# Patient Record
Sex: MALE | Race: WHITE | NOT HISPANIC OR LATINO | Employment: OTHER | RURAL
[De-identification: names, ages, dates, MRNs, and addresses within clinical notes are randomized per-mention and may not be internally consistent; named-entity substitution may affect disease eponyms.]

---

## 2017-01-01 ENCOUNTER — TELEPHONE (OUTPATIENT)
Dept: FAMILY MEDICINE CLINIC | Facility: CLINIC | Age: 72
End: 2017-01-01

## 2017-01-01 ENCOUNTER — LAB REQUISITION (OUTPATIENT)
Dept: LAB | Facility: HOSPITAL | Age: 72
End: 2017-01-01

## 2017-01-01 ENCOUNTER — APPOINTMENT (OUTPATIENT)
Dept: GENERAL RADIOLOGY | Facility: HOSPITAL | Age: 72
End: 2017-01-01

## 2017-01-01 ENCOUNTER — HOSPITAL ENCOUNTER (EMERGENCY)
Facility: HOSPITAL | Age: 72
Discharge: HOME | End: 2017-10-25
Attending: EMERGENCY MEDICINE | Admitting: EMERGENCY MEDICINE

## 2017-01-01 ENCOUNTER — HOSPITAL ENCOUNTER (EMERGENCY)
Facility: HOSPITAL | Age: 72
Discharge: ANOTHER HOSPITAL | End: 2017-11-13
Attending: FAMILY MEDICINE | Admitting: FAMILY MEDICINE

## 2017-01-01 ENCOUNTER — HOSPITAL ENCOUNTER (EMERGENCY)
Facility: HOSPITAL | Age: 72
Discharge: HOME | End: 2017-11-12
Attending: EMERGENCY MEDICINE | Admitting: EMERGENCY MEDICINE

## 2017-01-01 VITALS
HEIGHT: 67 IN | HEART RATE: 72 BPM | SYSTOLIC BLOOD PRESSURE: 104 MMHG | RESPIRATION RATE: 19 BRPM | TEMPERATURE: 97.7 F | OXYGEN SATURATION: 100 % | DIASTOLIC BLOOD PRESSURE: 57 MMHG | BODY MASS INDEX: 18.83 KG/M2 | WEIGHT: 120 LBS

## 2017-01-01 VITALS
WEIGHT: 125 LBS | HEIGHT: 70 IN | OXYGEN SATURATION: 99 % | DIASTOLIC BLOOD PRESSURE: 59 MMHG | SYSTOLIC BLOOD PRESSURE: 116 MMHG | RESPIRATION RATE: 18 BRPM | TEMPERATURE: 98.2 F | HEART RATE: 77 BPM | BODY MASS INDEX: 17.9 KG/M2

## 2017-01-01 VITALS
DIASTOLIC BLOOD PRESSURE: 59 MMHG | OXYGEN SATURATION: 100 % | BODY MASS INDEX: 16.89 KG/M2 | TEMPERATURE: 97.8 F | WEIGHT: 118 LBS | SYSTOLIC BLOOD PRESSURE: 117 MMHG | RESPIRATION RATE: 18 BRPM | HEART RATE: 73 BPM | HEIGHT: 70 IN

## 2017-01-01 DIAGNOSIS — I47.1 PSVT (PAROXYSMAL SUPRAVENTRICULAR TACHYCARDIA) (HCC): Primary | ICD-10-CM

## 2017-01-01 DIAGNOSIS — I47.1 PAROXYSMAL SVT (SUPRAVENTRICULAR TACHYCARDIA) (HCC): Primary | ICD-10-CM

## 2017-01-01 DIAGNOSIS — I47.1 SVT (SUPRAVENTRICULAR TACHYCARDIA) (HCC): Primary | ICD-10-CM

## 2017-01-01 DIAGNOSIS — L03.90 CELLULITIS: ICD-10-CM

## 2017-01-01 LAB
ALBUMIN SERPL-MCNC: 2.5 G/DL (ref 3.4–4.8)
ALBUMIN SERPL-MCNC: 2.5 G/DL (ref 3.4–4.8)
ALBUMIN SERPL-MCNC: 2.8 G/DL (ref 3.4–4.8)
ALBUMIN SERPL-MCNC: 2.9 G/DL (ref 3.4–4.8)
ALBUMIN/GLOB SERPL: 0.7 G/DL (ref 1.5–2.5)
ALBUMIN/GLOB SERPL: 0.8 G/DL (ref 1.5–2.5)
ALP SERPL-CCNC: 101 U/L (ref 40–129)
ALP SERPL-CCNC: 104 U/L (ref 40–129)
ALP SERPL-CCNC: 157 U/L (ref 40–129)
ALP SERPL-CCNC: 167 U/L (ref 40–129)
ALT SERPL W P-5'-P-CCNC: 16 U/L (ref 10–44)
ALT SERPL W P-5'-P-CCNC: 28 U/L (ref 10–44)
ALT SERPL W P-5'-P-CCNC: 91 U/L (ref 10–44)
ALT SERPL W P-5'-P-CCNC: 95 U/L (ref 10–44)
ANION GAP SERPL CALCULATED.3IONS-SCNC: 5.2 MMOL/L (ref 3.6–11.2)
ANION GAP SERPL CALCULATED.3IONS-SCNC: 6.8 MMOL/L (ref 3.6–11.2)
ANION GAP SERPL CALCULATED.3IONS-SCNC: 8.1 MMOL/L (ref 3.6–11.2)
ANION GAP SERPL CALCULATED.3IONS-SCNC: 9.7 MMOL/L (ref 3.6–11.2)
ANISOCYTOSIS BLD QL: ABNORMAL
ANISOCYTOSIS BLD QL: NORMAL
APTT PPP: 32.6 SECONDS (ref 23.8–36.1)
AST SERPL-CCNC: 22 U/L (ref 10–34)
AST SERPL-CCNC: 37 U/L (ref 10–34)
AST SERPL-CCNC: 87 U/L (ref 10–34)
AST SERPL-CCNC: 98 U/L (ref 10–34)
BASOPHILS # BLD AUTO: 0.01 10*3/MM3 (ref 0–0.3)
BASOPHILS NFR BLD AUTO: 1 % (ref 0–2)
BILIRUB SERPL-MCNC: 0.3 MG/DL (ref 0.2–1.8)
BNP SERPL-MCNC: 472 PG/ML (ref 0–100)
BUN BLD-MCNC: 14 MG/DL (ref 7–21)
BUN BLD-MCNC: 17 MG/DL (ref 7–21)
BUN BLD-MCNC: 23 MG/DL (ref 7–21)
BUN BLD-MCNC: 24 MG/DL (ref 7–21)
BUN/CREAT SERPL: 12.2 (ref 7–25)
BUN/CREAT SERPL: 13.7 (ref 7–25)
BUN/CREAT SERPL: 14.4 (ref 7–25)
BUN/CREAT SERPL: 15.8 (ref 7–25)
CALCIUM SPEC-SCNC: 8.5 MG/DL (ref 7.7–10)
CALCIUM SPEC-SCNC: 8.7 MG/DL (ref 7.7–10)
CALCIUM SPEC-SCNC: 9.1 MG/DL (ref 7.7–10)
CALCIUM SPEC-SCNC: 9.6 MG/DL (ref 7.7–10)
CHLORIDE SERPL-SCNC: 107 MMOL/L (ref 99–112)
CHLORIDE SERPL-SCNC: 108 MMOL/L (ref 99–112)
CHLORIDE SERPL-SCNC: 108 MMOL/L (ref 99–112)
CHLORIDE SERPL-SCNC: 111 MMOL/L (ref 99–112)
CK MB SERPL-CCNC: 0.33 NG/ML (ref 0–5)
CK MB SERPL-CCNC: 0.53 NG/ML (ref 0–5)
CK MB SERPL-RTO: 2.8 % (ref 0–3)
CK MB SERPL-RTO: 5.3 % (ref 0–3)
CK SERPL-CCNC: 10 U/L (ref 24–204)
CK SERPL-CCNC: 12 U/L (ref 24–204)
CO2 SERPL-SCNC: 21.3 MMOL/L (ref 24.3–31.9)
CO2 SERPL-SCNC: 23.9 MMOL/L (ref 24.3–31.9)
CO2 SERPL-SCNC: 25.2 MMOL/L (ref 24.3–31.9)
CO2 SERPL-SCNC: 26.8 MMOL/L (ref 24.3–31.9)
CREAT BLD-MCNC: 1.02 MG/DL (ref 0.43–1.29)
CREAT BLD-MCNC: 1.39 MG/DL (ref 0.43–1.29)
CREAT BLD-MCNC: 1.52 MG/DL (ref 0.43–1.29)
CREAT BLD-MCNC: 1.6 MG/DL (ref 0.43–1.29)
DEPRECATED RDW RBC AUTO: 66.5 FL (ref 37–54)
DEPRECATED RDW RBC AUTO: 69.6 FL (ref 37–54)
DEPRECATED RDW RBC AUTO: 70.5 FL (ref 37–54)
DEPRECATED RDW RBC AUTO: 71.2 FL (ref 37–54)
EOSINOPHIL # BLD AUTO: 0.14 10*3/MM3 (ref 0–0.7)
EOSINOPHIL # BLD MANUAL: 0.15 10*3/MM3 (ref 0–0.7)
EOSINOPHIL # BLD MANUAL: 0.4 10*3/MM3 (ref 0–0.7)
EOSINOPHIL # BLD MANUAL: 0.48 10*3/MM3 (ref 0–0.7)
EOSINOPHIL NFR BLD AUTO: 14.6 % (ref 0–7)
EOSINOPHIL NFR BLD MANUAL: 12 % (ref 0–7)
EOSINOPHIL NFR BLD MANUAL: 14 % (ref 0–7)
EOSINOPHIL NFR BLD MANUAL: 24 % (ref 0–7)
ERYTHROCYTE [DISTWIDTH] IN BLOOD BY AUTOMATED COUNT: 22.4 % (ref 11.5–14.5)
ERYTHROCYTE [DISTWIDTH] IN BLOOD BY AUTOMATED COUNT: 23.2 % (ref 11.5–14.5)
GFR SERPL CREATININE-BSD FRML MDRD: 43 ML/MIN/1.73
GFR SERPL CREATININE-BSD FRML MDRD: 46 ML/MIN/1.73
GFR SERPL CREATININE-BSD FRML MDRD: 51 ML/MIN/1.73
GFR SERPL CREATININE-BSD FRML MDRD: 72 ML/MIN/1.73
GLOBULIN UR ELPH-MCNC: 3.6 GM/DL
GLOBULIN UR ELPH-MCNC: 3.7 GM/DL
GLOBULIN UR ELPH-MCNC: 3.7 GM/DL
GLOBULIN UR ELPH-MCNC: 4 GM/DL
GLUCOSE BLD-MCNC: 105 MG/DL (ref 70–110)
GLUCOSE BLD-MCNC: 108 MG/DL (ref 70–110)
GLUCOSE BLD-MCNC: 112 MG/DL (ref 70–110)
GLUCOSE BLD-MCNC: 115 MG/DL (ref 70–110)
HCT VFR BLD AUTO: 27.9 % (ref 42–52)
HCT VFR BLD AUTO: 32.5 % (ref 42–52)
HCT VFR BLD AUTO: 34.4 % (ref 42–52)
HCT VFR BLD AUTO: 37.1 % (ref 42–52)
HGB BLD-MCNC: 10.8 G/DL (ref 14–18)
HGB BLD-MCNC: 11.7 G/DL (ref 14–18)
HGB BLD-MCNC: 8.6 G/DL (ref 14–18)
HGB BLD-MCNC: 9.9 G/DL (ref 14–18)
HYPOCHROMIA BLD QL: ABNORMAL
IMM GRANULOCYTES # BLD: 0 10*3/MM3 (ref 0–0.03)
IMM GRANULOCYTES NFR BLD: 0 % (ref 0–0.5)
INR PPP: 1.15 (ref 0.9–1.1)
LYMPHOCYTES # BLD AUTO: 0.17 10*3/MM3 (ref 1–3)
LYMPHOCYTES # BLD MANUAL: 0.15 10*3/MM3 (ref 1–3)
LYMPHOCYTES # BLD MANUAL: 0.34 10*3/MM3 (ref 1–3)
LYMPHOCYTES # BLD MANUAL: 0.57 10*3/MM3 (ref 1–3)
LYMPHOCYTES NFR BLD AUTO: 17.7 % (ref 16–46)
LYMPHOCYTES NFR BLD MANUAL: 12 % (ref 16–46)
LYMPHOCYTES NFR BLD MANUAL: 17 % (ref 16–46)
LYMPHOCYTES NFR BLD MANUAL: 20 % (ref 16–46)
LYMPHOCYTES NFR BLD MANUAL: 3 % (ref 0–12)
LYMPHOCYTES NFR BLD MANUAL: 6 % (ref 0–12)
LYMPHOCYTES NFR BLD MANUAL: 8 % (ref 0–12)
MCH RBC QN AUTO: 26.8 PG (ref 27–33)
MCH RBC QN AUTO: 27 PG (ref 27–33)
MCH RBC QN AUTO: 28.1 PG (ref 27–33)
MCH RBC QN AUTO: 28.3 PG (ref 27–33)
MCHC RBC AUTO-ENTMCNC: 30.5 G/DL (ref 33–37)
MCHC RBC AUTO-ENTMCNC: 30.8 G/DL (ref 33–37)
MCHC RBC AUTO-ENTMCNC: 31.4 G/DL (ref 33–37)
MCHC RBC AUTO-ENTMCNC: 31.5 G/DL (ref 33–37)
MCV RBC AUTO: 87.7 FL (ref 80–94)
MCV RBC AUTO: 88.1 FL (ref 80–94)
MCV RBC AUTO: 89.6 FL (ref 80–94)
MCV RBC AUTO: 89.6 FL (ref 80–94)
MONOCYTES # BLD AUTO: 0.09 10*3/MM3 (ref 0.1–0.9)
MONOCYTES # BLD AUTO: 0.1 10*3/MM3 (ref 0.1–0.9)
MONOCYTES # BLD AUTO: 0.12 10*3/MM3 (ref 0.1–0.9)
MONOCYTES # BLD AUTO: 0.13 10*3/MM3 (ref 0.1–0.9)
MONOCYTES NFR BLD AUTO: 13.5 % (ref 0–12)
NEUTROPHILS # BLD AUTO: 0.51 10*3/MM3 (ref 1.4–6.5)
NEUTROPHILS # BLD AUTO: 0.85 10*3/MM3 (ref 1.4–6.5)
NEUTROPHILS # BLD AUTO: 1.07 10*3/MM3 (ref 1.4–6.5)
NEUTROPHILS # BLD AUTO: 1.79 10*3/MM3 (ref 1.4–6.5)
NEUTROPHILS NFR BLD AUTO: 53.2 % (ref 40–75)
NEUTROPHILS NFR BLD MANUAL: 38 % (ref 40–75)
NEUTROPHILS NFR BLD MANUAL: 56 % (ref 40–75)
NEUTROPHILS NFR BLD MANUAL: 68 % (ref 40–75)
NEUTS BAND NFR BLD MANUAL: 15 % (ref 0–8)
NEUTS BAND NFR BLD MANUAL: 7 % (ref 0–8)
OSMOLALITY SERPL CALC.SUM OF ELEC: 278.5 MOSM/KG (ref 273–305)
OSMOLALITY SERPL CALC.SUM OF ELEC: 283.8 MOSM/KG (ref 273–305)
OSMOLALITY SERPL CALC.SUM OF ELEC: 283.8 MOSM/KG (ref 273–305)
OSMOLALITY SERPL CALC.SUM OF ELEC: 285.6 MOSM/KG (ref 273–305)
OVALOCYTES BLD QL SMEAR: NORMAL
PLAT MORPH BLD: NORMAL
PLAT MORPH BLD: NORMAL
PLATELET # BLD AUTO: 114 10*3/MM3 (ref 130–400)
PLATELET # BLD AUTO: 123 10*3/MM3 (ref 130–400)
PLATELET # BLD AUTO: 124 10*3/MM3 (ref 130–400)
PLATELET # BLD AUTO: 126 10*3/MM3 (ref 130–400)
PMV BLD AUTO: 10 FL (ref 6–10)
PMV BLD AUTO: 10 FL (ref 6–10)
PMV BLD AUTO: 10.4 FL (ref 6–10)
PMV BLD AUTO: 9.7 FL (ref 6–10)
POIKILOCYTOSIS BLD QL SMEAR: ABNORMAL
POTASSIUM BLD-SCNC: 3.5 MMOL/L (ref 3.5–5.3)
POTASSIUM BLD-SCNC: 4 MMOL/L (ref 3.5–5.3)
POTASSIUM BLD-SCNC: 4.2 MMOL/L (ref 3.5–5.3)
POTASSIUM BLD-SCNC: 4.7 MMOL/L (ref 3.5–5.3)
PROT SERPL-MCNC: 6.2 G/DL (ref 6–8)
PROT SERPL-MCNC: 6.2 G/DL (ref 6–8)
PROT SERPL-MCNC: 6.5 G/DL (ref 6–8)
PROT SERPL-MCNC: 6.8 G/DL (ref 6–8)
PROTHROMBIN TIME: 14.9 SECONDS (ref 11–15.4)
RBC # BLD AUTO: 3.18 10*6/MM3 (ref 4.7–6.1)
RBC # BLD AUTO: 3.69 10*6/MM3 (ref 4.7–6.1)
RBC # BLD AUTO: 3.84 10*6/MM3 (ref 4.7–6.1)
RBC # BLD AUTO: 4.14 10*6/MM3 (ref 4.7–6.1)
SCAN SLIDE: NORMAL
SMALL PLATELETS BLD QL SMEAR: ABNORMAL
SMALL PLATELETS BLD QL SMEAR: NORMAL
SODIUM BLD-SCNC: 139 MMOL/L (ref 135–153)
SODIUM BLD-SCNC: 140 MMOL/L (ref 135–153)
SODIUM BLD-SCNC: 140 MMOL/L (ref 135–153)
SODIUM BLD-SCNC: 142 MMOL/L (ref 135–153)
TROPONIN I SERPL-MCNC: 0.03 NG/ML
TROPONIN I SERPL-MCNC: <0.006 NG/ML
TROPONIN I SERPL-MCNC: <0.006 NG/ML
VANCOMYCIN TROUGH SERPL-MCNC: 25.8 MCG/ML (ref 5–15)
WBC NRBC COR # BLD: 0.96 10*3/MM3 (ref 4.5–12.5)
WBC NRBC COR # BLD: 1.25 10*3/MM3 (ref 4.5–12.5)
WBC NRBC COR # BLD: 2.02 10*3/MM3 (ref 4.5–12.5)
WBC NRBC COR # BLD: 2.84 10*3/MM3 (ref 4.5–12.5)

## 2017-01-01 PROCEDURE — 80053 COMPREHEN METABOLIC PANEL: CPT | Performed by: FAMILY MEDICINE

## 2017-01-01 PROCEDURE — 85025 COMPLETE CBC W/AUTO DIFF WBC: CPT | Performed by: FAMILY MEDICINE

## 2017-01-01 PROCEDURE — 84484 ASSAY OF TROPONIN QUANT: CPT | Performed by: EMERGENCY MEDICINE

## 2017-01-01 PROCEDURE — 85007 BL SMEAR W/DIFF WBC COUNT: CPT | Performed by: INTERNAL MEDICINE

## 2017-01-01 PROCEDURE — 82553 CREATINE MB FRACTION: CPT | Performed by: EMERGENCY MEDICINE

## 2017-01-01 PROCEDURE — 93005 ELECTROCARDIOGRAM TRACING: CPT | Performed by: EMERGENCY MEDICINE

## 2017-01-01 PROCEDURE — 93005 ELECTROCARDIOGRAM TRACING: CPT | Performed by: FAMILY MEDICINE

## 2017-01-01 PROCEDURE — 96374 THER/PROPH/DIAG INJ IV PUSH: CPT

## 2017-01-01 PROCEDURE — 84484 ASSAY OF TROPONIN QUANT: CPT | Performed by: FAMILY MEDICINE

## 2017-01-01 PROCEDURE — 71010 XR CHEST 1 VW: CPT | Performed by: RADIOLOGY

## 2017-01-01 PROCEDURE — 85007 BL SMEAR W/DIFF WBC COUNT: CPT | Performed by: EMERGENCY MEDICINE

## 2017-01-01 PROCEDURE — 99285 EMERGENCY DEPT VISIT HI MDM: CPT

## 2017-01-01 PROCEDURE — 83880 ASSAY OF NATRIURETIC PEPTIDE: CPT | Performed by: FAMILY MEDICINE

## 2017-01-01 PROCEDURE — 71010 HC CHEST PA OR AP: CPT

## 2017-01-01 PROCEDURE — 96361 HYDRATE IV INFUSION ADD-ON: CPT

## 2017-01-01 PROCEDURE — 85007 BL SMEAR W/DIFF WBC COUNT: CPT | Performed by: FAMILY MEDICINE

## 2017-01-01 PROCEDURE — 85025 COMPLETE CBC W/AUTO DIFF WBC: CPT | Performed by: INTERNAL MEDICINE

## 2017-01-01 PROCEDURE — 93010 ELECTROCARDIOGRAM REPORT: CPT | Performed by: INTERNAL MEDICINE

## 2017-01-01 PROCEDURE — 99284 EMERGENCY DEPT VISIT MOD MDM: CPT

## 2017-01-01 PROCEDURE — 96375 TX/PRO/DX INJ NEW DRUG ADDON: CPT

## 2017-01-01 PROCEDURE — 80053 COMPREHEN METABOLIC PANEL: CPT | Performed by: INTERNAL MEDICINE

## 2017-01-01 PROCEDURE — 80053 COMPREHEN METABOLIC PANEL: CPT | Performed by: EMERGENCY MEDICINE

## 2017-01-01 PROCEDURE — 85730 THROMBOPLASTIN TIME PARTIAL: CPT | Performed by: EMERGENCY MEDICINE

## 2017-01-01 PROCEDURE — 85025 COMPLETE CBC W/AUTO DIFF WBC: CPT | Performed by: EMERGENCY MEDICINE

## 2017-01-01 PROCEDURE — 82550 ASSAY OF CK (CPK): CPT | Performed by: EMERGENCY MEDICINE

## 2017-01-01 PROCEDURE — 85610 PROTHROMBIN TIME: CPT | Performed by: EMERGENCY MEDICINE

## 2017-01-01 PROCEDURE — 25010000002 ADENOSINE PER 6 MG

## 2017-01-01 PROCEDURE — 80202 ASSAY OF VANCOMYCIN: CPT | Performed by: INTERNAL MEDICINE

## 2017-01-01 RX ORDER — ADENOSINE 3 MG/ML
INJECTION, SOLUTION INTRAVENOUS
Status: COMPLETED
Start: 2017-01-01 | End: 2017-01-01

## 2017-01-01 RX ORDER — ADENOSINE 3 MG/ML
6 INJECTION, SOLUTION INTRAVENOUS ONCE
Status: COMPLETED | OUTPATIENT
Start: 2017-01-01 | End: 2017-01-01

## 2017-01-01 RX ORDER — SODIUM CHLORIDE 9 MG/ML
INJECTION, SOLUTION INTRAVENOUS
Status: COMPLETED
Start: 2017-01-01 | End: 2017-01-01

## 2017-01-01 RX ORDER — METOPROLOL SUCCINATE 50 MG/1
50 TABLET, EXTENDED RELEASE ORAL DAILY
Qty: 15 TABLET | Refills: 0 | Status: SHIPPED | OUTPATIENT
Start: 2017-01-01

## 2017-01-01 RX ORDER — SODIUM CHLORIDE 9 MG/ML
INJECTION, SOLUTION INTRAVENOUS
Status: DISCONTINUED
Start: 2017-01-01 | End: 2017-01-01 | Stop reason: HOSPADM

## 2017-01-01 RX ORDER — SODIUM CHLORIDE 0.9 % (FLUSH) 0.9 %
10 SYRINGE (ML) INJECTION AS NEEDED
Status: DISCONTINUED | OUTPATIENT
Start: 2017-01-01 | End: 2017-01-01 | Stop reason: HOSPADM

## 2017-01-01 RX ORDER — ADENOSINE 3 MG/ML
12 INJECTION, SOLUTION INTRAVENOUS ONCE
Status: COMPLETED | OUTPATIENT
Start: 2017-01-01 | End: 2017-01-01

## 2017-01-01 RX ORDER — METOPROLOL TARTRATE 5 MG/5ML
5 INJECTION INTRAVENOUS ONCE
Status: COMPLETED | OUTPATIENT
Start: 2017-01-01 | End: 2017-01-01

## 2017-01-01 RX ADMIN — ADENOSINE 12 MG: 3 INJECTION, SOLUTION INTRAVENOUS at 04:24

## 2017-01-01 RX ADMIN — ADENOSINE 6 MG: 3 INJECTION, SOLUTION INTRAVENOUS at 14:17

## 2017-01-01 RX ADMIN — SODIUM CHLORIDE 500 ML: 9 INJECTION, SOLUTION INTRAVENOUS at 14:16

## 2017-01-01 RX ADMIN — METOPROLOL TARTRATE 5 MG: 1 INJECTION, SOLUTION INTRAVENOUS at 04:31

## 2017-01-01 RX ADMIN — ADENOSINE 6 MG: 3 INJECTION, SOLUTION INTRAVENOUS at 16:22

## 2017-01-01 RX ADMIN — SODIUM CHLORIDE 1000 ML: 9 INJECTION, SOLUTION INTRAVENOUS at 04:20

## 2017-01-01 RX ADMIN — SODIUM CHLORIDE: 9 INJECTION, SOLUTION INTRAVENOUS at 16:15

## 2017-01-01 RX ADMIN — SODIUM CHLORIDE 500 ML: 9 INJECTION, SOLUTION INTRAVENOUS at 16:07

## 2017-02-01 ENCOUNTER — APPOINTMENT (OUTPATIENT)
Dept: ULTRASOUND IMAGING | Facility: HOSPITAL | Age: 72
End: 2017-02-01
Attending: INTERNAL MEDICINE

## 2017-04-20 ENCOUNTER — OFFICE VISIT (OUTPATIENT)
Dept: FAMILY MEDICINE CLINIC | Facility: CLINIC | Age: 72
End: 2017-04-20

## 2017-04-20 ENCOUNTER — HOSPITAL ENCOUNTER (OUTPATIENT)
Dept: GENERAL RADIOLOGY | Facility: HOSPITAL | Age: 72
Discharge: HOME | End: 2017-04-20
Admitting: NURSE PRACTITIONER

## 2017-04-20 VITALS
SYSTOLIC BLOOD PRESSURE: 111 MMHG | OXYGEN SATURATION: 98 % | BODY MASS INDEX: 18.54 KG/M2 | DIASTOLIC BLOOD PRESSURE: 60 MMHG | TEMPERATURE: 99.2 F | HEIGHT: 70 IN | WEIGHT: 129.5 LBS | HEART RATE: 104 BPM

## 2017-04-20 DIAGNOSIS — J02.8 ACUTE PHARYNGITIS DUE TO OTHER SPECIFIED ORGANISMS: ICD-10-CM

## 2017-04-20 DIAGNOSIS — R50.81 FEVER IN OTHER DISEASES: Primary | ICD-10-CM

## 2017-04-20 DIAGNOSIS — R05.9 COUGH: ICD-10-CM

## 2017-04-20 DIAGNOSIS — R50.81 FEVER IN OTHER DISEASES: ICD-10-CM

## 2017-04-20 LAB
EXPIRATION DATE: NORMAL
EXPIRATION DATE: NORMAL
FLUAV AG NPH QL: NEGATIVE
FLUBV AG NPH QL: NEGATIVE
INTERNAL CONTROL: NORMAL
INTERNAL CONTROL: NORMAL
Lab: NORMAL
Lab: NORMAL
S PYO AG THROAT QL: NEGATIVE

## 2017-04-20 PROCEDURE — 71020 XR CHEST PA AND LATERAL: CPT | Performed by: RADIOLOGY

## 2017-04-20 PROCEDURE — 99213 OFFICE O/P EST LOW 20 MIN: CPT | Performed by: NURSE PRACTITIONER

## 2017-04-20 PROCEDURE — 71020 HC CHEST PA AND LATERAL: CPT

## 2017-04-20 PROCEDURE — 87880 STREP A ASSAY W/OPTIC: CPT | Performed by: NURSE PRACTITIONER

## 2017-04-20 PROCEDURE — 87804 INFLUENZA ASSAY W/OPTIC: CPT | Performed by: NURSE PRACTITIONER

## 2017-04-20 RX ORDER — AZITHROMYCIN 250 MG/1
TABLET, FILM COATED ORAL
Qty: 6 TABLET | Refills: 0 | Status: SHIPPED | OUTPATIENT
Start: 2017-04-20

## 2017-04-20 NOTE — PROGRESS NOTES
"Subjective   Rolan Page is a 71 y.o. male.   Chief Complaint   Patient presents with   • Sore Throat     Sore Throat    This is a new problem. The current episode started 1 to 4 weeks ago. The problem has been waxing and waning. The maximum temperature recorded prior to his arrival was 101 - 101.9 F. Associated symptoms include congestion and coughing. Pertinent negatives include no abdominal pain, diarrhea, ear discharge, ear pain, headaches, shortness of breath, swollen glands, trouble swallowing or vomiting. Associated symptoms comments: Back pain. He has had exposure to strep. He has tried acetaminophen for the symptoms. The treatment provided no relief.        The following portions of the patient's history were reviewed and updated as appropriate: allergies, current medications, past family history, past medical history, past social history, past surgical history and problem list.  /60 (BP Location: Left arm, Patient Position: Sitting)  Pulse 104  Temp 99.2 °F (37.3 °C) (Oral)   Ht 70\" (177.8 cm)  Wt 129 lb 8 oz (58.7 kg)  SpO2 98% Comment: Room air  BMI 18.58 kg/m2  Review of Systems   Constitutional: Negative for chills, fatigue and fever.   HENT: Positive for congestion and sore throat. Negative for ear discharge, ear pain, rhinorrhea and trouble swallowing.    Respiratory: Positive for cough. Negative for shortness of breath and wheezing.    Cardiovascular: Negative for chest pain, palpitations and leg swelling.   Gastrointestinal: Negative for abdominal pain, diarrhea, nausea and vomiting.   Genitourinary: Negative for dysuria.   Musculoskeletal: Negative for back pain and myalgias.   Skin: Negative for rash and wound.   Neurological: Negative for dizziness, light-headedness and headaches.   Psychiatric/Behavioral: Negative for sleep disturbance. The patient is not nervous/anxious.        Objective   Physical Exam   Constitutional: He is oriented to person, place, and time. He appears " well-developed and well-nourished.   HENT:   Head: Normocephalic and atraumatic.   Right Ear: External ear normal.   Left Ear: External ear normal.   Oropharynx erythematous   Cardiovascular: Normal rate, regular rhythm and normal heart sounds.    Pulmonary/Chest: Effort normal.   Diminished sounds throughout   Abdominal: Soft. Bowel sounds are normal.   Musculoskeletal: Normal range of motion.   Lymphadenopathy:     He has no cervical adenopathy.   Neurological: He is alert and oriented to person, place, and time.   Skin: Skin is warm and dry.   Psychiatric: He has a normal mood and affect. His behavior is normal.       Assessment/Plan   Rolan was seen today for sore throat.    Diagnoses and all orders for this visit:    Fever in other diseases  -     POC Rapid Strep A  -     POC Influenza A / B  -     XR Chest PA & Lateral; Future    Acute pharyngitis due to other specified organisms  -     azithromycin (ZITHROMAX Z-PEPE) 250 MG tablet; Take 2 tablets the first day, then 1 tablet daily for 4 days.    Cough  -     XR Chest PA & Lateral; Future      Rapid strep negative  Flu A/B negative    Symptoms are consistent with strep pharyngitis despite the negative rapid strep test. The cough and fever are concerning as well. Will order chest x-ray to rule out pneumonia. Treat with Azithromycin. Administration and SE discussed. Supportive measures encouraged. Follow up in 2-3 days if no improvement or if symptoms worsen.

## 2017-10-25 NOTE — ED PROVIDER NOTES
Subjective   HPI Comments: Patient comes in with acute onset palpitations or fast heart rate.  Generalized weakness.  Shortness of breath.  This is similar to an incident he had last week.  He was transferred to the [facility].  He had to be cardioverted during that admission for either SVT or A. fib..  He was discharged home on a medicine that starts with an believed to be metoprolol.    Patient is a 72 y.o. male presenting with palpitations.   History provided by:  Patient, spouse and medical records  Palpitations   Palpitations quality:  Fast  Onset quality:  Sudden  Duration: 0315.  Timing:  Constant  Progression:  Unchanged  Chronicity:  Recurrent  Context: not anxiety, not appetite suppressants, not blood loss, not bronchodilators, not caffeine, not dehydration, not exercise, not hyperventilation, not illicit drugs, not nicotine and not stimulant use    Relieved by:  Nothing  Worsened by:  Nothing  Ineffective treatments:  Valsalva  Associated symptoms: chest pressure, malaise/fatigue, shortness of breath and weakness    Associated symptoms: no back pain, no chest pain, no cough, no diaphoresis, no dizziness, no hemoptysis, no leg pain, no lower extremity edema, no nausea, no near-syncope, no numbness, no orthopnea, no PND, no syncope and no vomiting    Risk factors: stress    Risk factors: no diabetes mellitus, no heart disease, no hx of DVT, no hx of PE, no hx of thyroid disease, no hypercoagulable state, no hyperthyroidism and no OTC sinus medications        Review of Systems   Constitutional: Positive for malaise/fatigue. Negative for diaphoresis.   HENT: Negative.  Negative for congestion, nosebleeds and postnasal drip.    Eyes: Negative.    Respiratory: Positive for chest tightness and shortness of breath. Negative for cough and hemoptysis.    Cardiovascular: Positive for palpitations. Negative for chest pain, orthopnea, syncope, PND and near-syncope.   Gastrointestinal: Negative.  Negative  for nausea and vomiting.   Endocrine: Negative.    Genitourinary: Negative.    Musculoskeletal: Negative.  Negative for back pain.   Skin: Negative.    Allergic/Immunologic: Positive for immunocompromised state.   Neurological: Positive for weakness. Negative for dizziness and numbness.   Hematological: Bruises/bleeds easily.   Psychiatric/Behavioral: Negative.    All other systems reviewed and are negative.      Past Medical History:   Diagnosis Date   • BPH    • BPH (benign prostatic hyperplasia)    • Degenerative arthritis    • Dyslipidemia    • Hyperlipidemia    • Hypertension    • Left inguinal hernia    • SVT (supraventricular tachycardia)        No Known Allergies    Past Surgical History:   Procedure Laterality Date   • HERNIA REPAIR     • HERNIA REPAIR     • [place] DRAIN LOWER LEG DEEP ABSC/HEMATOMA Right 8/29/2016    Procedure: LOWER EXTREMITY DEBRIDEMENT;  Surgeon: Alok Munoz MD;  Location: [facility];  Service: General       Family History   Problem Relation Age of Onset   • Hypertension Mother        Social History     Social History   • Marital status:      Spouse name: N/A   • Number of children: N/A   • Years of education: N/A     Social History Main Topics   • Smoking status: Current Every Day Smoker     Packs/day: 1.00     Years: 35.00   • Smokeless tobacco: Never Used   • Alcohol use No      Comment: rarely   • Drug use: No   • Sexual activity: Defer     Other Topics Concern   • None     Social History Narrative   • None           Objective   Physical Exam   Constitutional: He appears well-developed. No distress.   Thin, chronically ill appearing   HENT:   Head: Normocephalic and atraumatic.   Nose: Nose normal.   Mouth/Throat: Oropharynx is clear and moist.   Eyes: EOM are normal. Right eye exhibits no discharge. Left eye exhibits no discharge. No scleral icterus.   Conjunctivae injected   Neck: Normal range of motion. Neck supple. No JVD present. No tracheal deviation present. No  thyromegaly present.   Cardiovascular: Intact distal pulses.  Exam reveals no gallop and no friction rub.    No murmur heard.  Regular high rate rapid tachycardia   Pulmonary/Chest: Effort normal and breath sounds normal. No stridor. No respiratory distress. He has no wheezes. He has no rales. He exhibits no tenderness.   Tachypnea   Abdominal: Soft. Bowel sounds are normal. He exhibits no distension and no mass. There is no tenderness. There is no guarding.   Musculoskeletal: Normal range of motion. He exhibits no edema, tenderness or deformity.   Lymphadenopathy:     He has no cervical adenopathy.   Skin: Skin is warm and dry. No pallor.   Psychiatric: He has a normal mood and affect. His behavior is normal. Judgment and thought content normal.   Nursing note and vitals reviewed.      Procedures         ED Course  ED Course   Value Comment By Time   ECG 12 Lead SVT per Dr. Sandhu. TANIA Hopson 10/25 0427    Patient with recent history of newly diagnosed SVT.  Patient was transferred and treated at the [facility].  Patient awakened at 3:15 AM with chest tightness and rapid heart palpitations.  Patient had IV started oxygen placed.  Cardiac monitor crash cart.  He received 12 mg of Adenocard by rapid IV push with conversion to normal sinus rhythm and improvement in his blood pressure. Osmar Sandhu MD 10/25 0431   ECG 12 Lead 12-lead EKG performed at 0416 hrs.  Interpreted by me at the bedside.  Supraventricular tachycardia.  ST segment abnormality possible subendocardial heel injury.  Ventricular rate 181.  QRS 88.  .  QTc 448.  Diffuse ST segment depression likely rate related.  No pathologic blocks.  Supraventricular tachycardia.  No criteria for STEMI. Osmar Sandhu MD 10/25 0602   ECG 12 Lead (Reviewed) Osmar Sandhu MD 10/25 0654    12-lead EKG performed at 0431 hrs.  Performed after administration of 12 mg Adenocard.Normal sinus rhythm.  Incomplete right  bundle branch block.  Ventricular rate 98.MO interval 124.  QRS duration 94.  QTc 382.  QTc 487.  Conversion to normal sinus rhythm.  ST segment depression has resolved.  No overt ischemia. Osmar Sandhu MD 10/25 0655    Patient remains hemodynamically stable.  Resting comfortably.  Negative serial cardiac enzymes. Osmar Sandhu MD 10/25 0802     No orders to display     Labs Reviewed   COMPREHENSIVE METABOLIC PANEL - Abnormal; Notable for the following:        Result Value    Glucose 115 (*)     Creatinine 1.39 (*)     CO2 21.3 (*)     Albumin 2.50 (*)     eGFR Non  Amer 51 (*)     A/G Ratio 0.7 (*)     All other components within normal limits   PROTIME-INR - Abnormal; Notable for the following:     INR 1.15 (*)     All other components within normal limits    Narrative:     Suggested INR therapeutic range for stable oral anticoagulant therapy:    Low Intensity therapy:   1.5-2.0  Moderate Intensity therapy:   2.0-3.0  High Intensity therapy:   2.5-4.0   CK - Abnormal; Notable for the following:     Creatine Kinase 12 (*)     All other components within normal limits   CBC WITH AUTO DIFFERENTIAL - Abnormal; Notable for the following:     WBC 1.25 (*)     RBC 3.18 (*)     Hemoglobin 8.6 (*)     Hematocrit 27.9 (*)     MCHC 30.8 (*)     RDW 22.4 (*)     RDW-SD 66.5 (*)     Platelets 124 (*)     All other components within normal limits   CK - Abnormal; Notable for the following:     Creatine Kinase 10 (*)     All other components within normal limits   CKMB INDEX CALCULATION - Abnormal; Notable for the following:     CK-MB Index 5.3 (*)     All other components within normal limits   MANUAL DIFFERENTIAL - Abnormal; Notable for the following:     Lymphocyte % 12.0 (*)     Eosinophil % 12.0 (*)     Neutrophils Absolute 0.85 (*)     Lymphocytes Absolute 0.15 (*)     All other components within normal limits   APTT - Normal    Narrative:     PTT Heparin Therapeutic Range:  59 - 95 seconds   CK MB  - Normal   TROPONIN (IN-HOUSE) - Normal    Narrative:     Ultra Troponin I Reference Range:         <=0.039 ng/mL: Negative    0.04-0.779 ng/mL: Indeterminate Range. Suspicious of MI.  Clinical correlation required.       >=0.78  ng/mL: Consistent with myocardial injury.  Clinical correlation required.   OSMOLALITY, CALCULATED - Normal   CKMB INDEX CALCULATION - Normal   CK MB - Normal   TROPONIN (IN-HOUSE) - Normal    Narrative:     Ultra Troponin I Reference Range:         <=0.039 ng/mL: Negative    0.04-0.779 ng/mL: Indeterminate Range. Suspicious of MI.  Clinical correlation required.       >=0.78  ng/mL: Consistent with myocardial injury.  Clinical correlation required.   SCAN SLIDE   CBC AND DIFFERENTIAL    Narrative:     The following orders were created for panel order CBC & Differential.  Procedure                               Abnormality         Status                     ---------                               -----------         ------                     Manual Differential[[id number]]           Abnormal            Final result               Scan Slide[[id number]]                                        Final result               CBC Auto Differential[[id number]]         Abnormal            Final result                 Please view results for these tests on the individual orders.        Medication List      START taking these medications          metoprolol succinate XL 50 MG 24 hr tablet   Commonly known as:  TOPROL-XL   Take 1 tablet by mouth Daily.         CHANGE how you take these medications          famotidine 20 MG tablet   Commonly known as:  PEPCID   Take 1 tablet by mouth 2 (two) times a day.   What changed:    - when to take this  - reasons to take this         CONTINUE taking these medications          aspirin 81 MG tablet       azithromycin 250 MG tablet   Commonly known as:  ZITHROMAX Z-PEPE   Take 2 tablets the first day, then 1 tablet daily for 4 days.       benazepril 20 MG tablet   Commonly  known as:  LOTENSIN       CLARITIN 10 MG capsule   Generic drug:  Loratadine       collagenase 250 UNIT/GM ointment   Apply  topically every 12 (twelve) hours.       ondansetron 4 MG tablet   Commonly known as:  ZOFRAN   Take 1 tablet by mouth Every 6 (Six) Hours. PRN Nausea/vomiting       simvastatin 40 MG tablet   Commonly known as:  ZOCOR       terazosin 1 MG capsule   Commonly known as:  HYTRIN                  MDM  Number of Diagnoses or Management Options  Paroxysmal SVT (supraventricular tachycardia): new and requires workup     Amount and/or Complexity of Data Reviewed  Clinical lab tests: ordered and reviewed  Tests in the radiology section of CPT®: ordered and reviewed  Decide to obtain previous medical records or to obtain history from someone other than the patient: yes    Risk of Complications, Morbidity, and/or Mortality  Presenting problems: high  Diagnostic procedures: high  Management options: moderate    Patient Progress  Patient progress: improved      Final diagnoses:   Paroxysmal SVT (supraventricular tachycardia)            Osmar Sandhu MD  10/25/17 0804

## 2017-10-25 NOTE — ED NOTES
Pt states he woke up at 0245 with the feeling that his heart was beating rapidly, pt states he is currently wearing a holter monitor due to new diagnosis of rapid heart rate, pt states [facility].     Kamille Rose RN  10/25/17 1095

## 2017-10-25 NOTE — ED NOTES
Pt states he takes medication for prostate problems and high cholesterol.  Also pt states he is also receiving vancomycin and zosyn through his PICC line in his right upper arm for an infection he has in his right leg.     Kamille Rose RN  10/25/17 8917

## 2017-11-12 NOTE — ED PROVIDER NOTES
Subjective   History of Present Illness  72-year-old white male complains of palpitations.  Patient describes acute onset of palpitations while at rest watching football game at 45 minutes prior to arrival.  He tried vagal maneuvers at home without success.  He describes mild chest pain, palpitations, shortness of breath.  He has had one similar episode in the past which resolved after Adenocard.  Review of Systems   All other systems reviewed and are negative.      Past Medical History:   Diagnosis Date   • BPH    • BPH (benign prostatic hyperplasia)    • Degenerative arthritis    • Dyslipidemia    • Hyperlipidemia    • Hypertension    • Left inguinal hernia    • SVT (supraventricular tachycardia)        No Known Allergies    Past Surgical History:   Procedure Laterality Date   • HERNIA REPAIR     • HERNIA REPAIR     • [place] DRAIN LOWER LEG DEEP ABSC/HEMATOMA Right 8/29/2016    Procedure: LOWER EXTREMITY DEBRIDEMENT;  Surgeon: Alok Munoz MD;  [place] [facility];  Service: General       Family History   Problem Relation Age of Onset   • Hypertension Mother        Social History     Social History   • Marital status:      Spouse name: N/A   • Number of children: N/A   • Years of education: N/A     Social History Main Topics   • Smoking status: Current Every Day Smoker     Packs/day: 1.00     Years: 35.00   • Smokeless tobacco: Never Used   • Alcohol use No      Comment: rarely   • Drug use: No   • Sexual activity: Defer     Other Topics Concern   • None     Social History Narrative           Objective   Physical Exam   Constitutional: He is oriented to person, place, and time. He appears well-developed and well-nourished.   HENT:   Head: Normocephalic and atraumatic.   Cardiovascular: Normal rate, regular rhythm and normal heart sounds.  Exam reveals no gallop and no friction rub.    No murmur heard.  Pulmonary/Chest: Effort normal and breath sounds normal. No respiratory distress. He has no wheezes. He has  no rales.   Abdominal: Soft. Bowel sounds are normal. He exhibits no distension. There is no tenderness.   Musculoskeletal: Normal range of motion.   Neurological: He is alert and oriented to person, place, and time.   Skin: Skin is warm and dry.   Psychiatric: He has a normal mood and affect.   Nursing note and vitals reviewed.      Procedures  Results for orders placed or performed during the hospital encounter of 11/12/17   Comprehensive Metabolic Panel   Result Value Ref Range    Glucose 112 (H) 70 - 110 mg/dL    BUN 23 (H) 7 - 21 mg/dL    Creatinine 1.60 (H) 0.43 - 1.29 mg/dL    Sodium 140 135 - 153 mmol/L    Potassium 4.7 3.5 - 5.3 mmol/L    Chloride 108 99 - 112 mmol/L    CO2 25.2 24.3 - 31.9 mmol/L    Calcium 9.6 7.7 - 10.0 mg/dL    Total Protein 6.8 6.0 - 8.0 g/dL    Albumin 2.80 (L) 3.40 - 4.80 g/dL    ALT (SGPT) 95 (H) 10 - 44 U/L    AST (SGOT) 98 (H) 10 - 34 U/L    Alkaline Phosphatase 167 (H) 40 - 129 U/L    Total Bilirubin 0.3 0.2 - 1.8 mg/dL    eGFR Non African Amer 43 (L) >60 mL/min/1.73    Globulin 4.0 gm/dL    A/G Ratio 0.7 (L) 1.5 - 2.5 g/dL    BUN/Creatinine Ratio 14.4 7.0 - 25.0    Anion Gap 6.8 3.6 - 11.2 mmol/L   Troponin   Result Value Ref Range    Troponin I <0.006 <=0.040 ng/mL   CBC Auto Differential   Result Value Ref Range    WBC 2.84 (L) 4.50 - 12.50 10*3/mm3    RBC 4.14 (L) 4.70 - 6.10 10*6/mm3    Hemoglobin 11.7 (L) 14.0 - 18.0 g/dL    Hematocrit 37.1 (L) 42.0 - 52.0 %    MCV 89.6 80.0 - 94.0 fL    MCH 28.3 27.0 - 33.0 pg    MCHC 31.5 (L) 33.0 - 37.0 g/dL    RDW 22.4 (H) 11.5 - 14.5 %    RDW-SD 71.2 (H) 37.0 - 54.0 fl    MPV 10.0 6.0 - 10.0 fL    Platelets 123 (L) 130 - 400 10*3/mm3   Scan Slide   Result Value Ref Range    Scan Slide     Osmolality, Calculated   Result Value Ref Range    Osmolality Calc 283.8 273.0 - 305.0 mOsm/kg   Manual Differential   Result Value Ref Range    Neutrophil % 56.0 40.0 - 75.0 %    Lymphocyte % 20.0 16.0 - 46.0 %    Monocyte % 3.0 0.0 - 12.0 %     Eosinophil % 14.0 (H) 0.0 - 7.0 %    Bands %  7.0 0.0 - 8.0 %    Neutrophils Absolute 1.79 1.40 - 6.50 10*3/mm3    Lymphocytes Absolute 0.57 (L) 1.00 - 3.00 10*3/mm3    Monocytes Absolute 0.09 (L) 0.10 - 0.90 10*3/mm3    Eosinophils Absolute 0.40 0.00 - 0.70 10*3/mm3    Anisocytosis Large/3+ None Seen    Platelet Morphology Normal Normal            ED Course  ED Course   Value Comment By Time   ECG 12 Lead SVT.  Rate: 180.  No acute ischemic changes. Jamison Ogden MD 11/12 1809    Currently, patient without complaints.  Heart rate of 75, BP 01/04/57.  No further SVT noted. Jamison Ogden MD 11/12 1824                  MDM  Number of Diagnoses or Management Options  SVT (supraventricular tachycardia):      Amount and/or Complexity of Data Reviewed  Clinical lab tests: reviewed  Tests in the radiology section of CPT®: reviewed  Tests in the medicine section of CPT®: reviewed  Decide to obtain previous medical records or to obtain history from someone other than the patient: yes        Final diagnoses:   SVT (supraventricular tachycardia)            Jamison Ogden MD  11/12/17 2615

## 2017-11-12 NOTE — ED NOTES
Pt is lying in bed stating he feels much better, denies any chest pain at this time, pt is in sinus rhythm at 95     Roxie Martinez RN  11/12/17 6576

## 2017-11-13 NOTE — ED NOTES
Called [facility] for pt transfer to [facility] in [place].  They accepted but itll be a minute and then they will page it out.     Mechelle Alcantara  11/13/17 9241

## 2017-11-13 NOTE — ED NOTES
Called [facility] for pt transfer.  They have one transfer ahead of us and then they will call me back.      Mechelle Alcantara  11/13/17 1817

## 2017-11-13 NOTE — ED PROVIDER NOTES
Subjective   History of Present Illness  71 y/o M w/ recent onset of PSVT seen here yesterday and given adenocard with conversion to sinus rhythm. Pt at cardiology today and on return home felt his heart begin to race w/ SOA and CP. Pt scheduled for ablation in 3 wks at [facility] in [place], [place].   Review of Systems   Constitutional: Negative for chills, fatigue and fever.   Eyes: Negative for photophobia and visual disturbance.   Respiratory: Positive for shortness of breath. Negative for cough, chest tightness and wheezing.    Cardiovascular: Positive for chest pain and palpitations. Negative for leg swelling.   Gastrointestinal: Negative for abdominal distention and abdominal pain.   Genitourinary: Negative for difficulty urinating and dysuria.   Musculoskeletal: Negative for back pain, neck pain and neck stiffness.   Skin: Negative for color change and pallor.   All other systems reviewed and are negative.      Past Medical History:   Diagnosis Date   • BPH    • BPH (benign prostatic hyperplasia)    • Degenerative arthritis    • Dyslipidemia    • Hyperlipidemia    • Hypertension    • Left inguinal hernia    • SVT (supraventricular tachycardia)        No Known Allergies    Past Surgical History:   Procedure Laterality Date   • HERNIA REPAIR     • HERNIA REPAIR     • CA DRAIN LOWER LEG DEEP ABSC/HEMATOMA Right 8/29/2016    Procedure: LOWER EXTREMITY DEBRIDEMENT;  Surgeon: Alok Munoz MD;  Location: [facility];  Service: General       Family History   Problem Relation Age of Onset   • Hypertension Mother        Social History     Social History   • Marital status:      Spouse name: N/A   • Number of children: N/A   • Years of education: N/A     Social History Main Topics   • Smoking status: Current Every Day Smoker     Packs/day: 1.00     Years: 35.00   • Smokeless tobacco: Never Used   • Alcohol use No      Comment: rarely   • Drug use: No   • Sexual activity: Defer     Other Topics Concern   • None      Social History Narrative           Objective   Physical Exam   Constitutional: He is oriented to person, place, and time. He appears well-developed and well-nourished. He is active.   HENT:   Head: Normocephalic and atraumatic.   Right Ear: Hearing and external ear normal.   Left Ear: Hearing and external ear normal.   Nose: Nose normal.   Mouth/Throat: Uvula is midline, oropharynx is clear and moist and mucous membranes are normal.   Eyes: Conjunctivae, EOM and lids are normal. Pupils are equal, round, and reactive to light.   Neck: Trachea normal, normal range of motion, full passive range of motion without pain and phonation normal. Neck supple.   Cardiovascular: Regular rhythm and normal heart sounds.  Tachycardia present.    PSVT on presentation. 6mg adenocard given and pt converted to sinus rhythm with normal cardiac exam   Pulmonary/Chest: Effort normal and breath sounds normal.   Abdominal: Normal appearance.   Neurological: He is alert and oriented to person, place, and time. GCS eye subscore is 4. GCS verbal subscore is 5. GCS motor subscore is 6.   Skin: Skin is warm, dry and intact.   Psychiatric: He has a normal mood and affect. His speech is normal and behavior is normal. Cognition and memory are normal.   Nursing note and vitals reviewed.      Procedures         ED Course  ED Course   Value Comment By Time   ECG 12 Lead S/P adenocard 6mg. NSR, 87 bpm. QTc 445ms. No ST segment abnormalities concerning for STEMI. R BBB. Kobe Brown MD 11/13 7569      I spoke with Dr Marquis who stated that the pt would need a higher-level bed and should be transferred given that he was hemodynamically unstable w/ SVT when he arrived at our facility.           MDM  Number of Diagnoses or Management Options  PSVT (paroxysmal supraventricular tachycardia): established and worsening     Amount and/or Complexity of Data Reviewed  Clinical lab tests: reviewed and ordered  Tests in the radiology section of CPT®:  reviewed and ordered  Tests in the medicine section of CPT®: reviewed and ordered  Decide to obtain previous medical records or to obtain history from someone other than the patient: yes  Discuss the patient with other providers: yes  Independent visualization of images, tracings, or specimens: yes    Risk of Complications, Morbidity, and/or Mortality  Presenting problems: high  Diagnostic procedures: high  Management options: high    Patient Progress  Patient progress: stable      Final diagnoses:   PSVT (paroxysmal supraventricular tachycardia)            Kobe Brown MD  11/13/17 [id number]

## 2017-11-13 NOTE — ED NOTES
After discussing adenosine with patient. 6mg given and heartrate converted to 70's. Pt tolerated well. Dr cruz at bedside     Boby Zabala RN  11/13/17 4577

## 2017-11-13 NOTE — ED NOTES
SHANI FROM [facility] IN [place] CALLED [facility] AND NUMBER TO CALL REPORT [phone]     Therese Chen, MARCO A  11/13/17 4475

## 2017-11-14 NOTE — ED NOTES
PT SIGNED EMTALA FOR TRANSFER TO [facility].     Therese Chen, MARCO A  11/13/17 3049

## 2018-01-01 ENCOUNTER — LAB REQUISITION (OUTPATIENT)
Dept: LAB | Facility: HOSPITAL | Age: 73
End: 2018-01-01

## 2018-01-01 DIAGNOSIS — C92.90: ICD-10-CM

## 2018-01-01 DIAGNOSIS — R79.82 ELEVATED C-REACTIVE PROTEIN (CRP): ICD-10-CM

## 2018-01-01 DIAGNOSIS — A41.52 SEPSIS DUE TO PSEUDOMONAS (HCC): ICD-10-CM

## 2018-01-01 DIAGNOSIS — S81.801D UNSPECIFIED OPEN WOUND, RIGHT LOWER LEG, SUBSEQUENT ENCOUNTER: ICD-10-CM

## 2018-01-01 LAB
ALBUMIN SERPL-MCNC: 1.7 G/DL (ref 3.4–4.8)
ALBUMIN SERPL-MCNC: 1.8 G/DL (ref 3.4–4.8)
ALBUMIN SERPL-MCNC: 1.8 G/DL (ref 3.4–4.8)
ALBUMIN/GLOB SERPL: 0.4 G/DL (ref 1.5–2.5)
ALP SERPL-CCNC: 199 U/L (ref 40–129)
ALP SERPL-CCNC: 202 U/L (ref 40–129)
ALP SERPL-CCNC: 208 U/L (ref 40–129)
ALT SERPL W P-5'-P-CCNC: 19 U/L (ref 10–44)
ALT SERPL W P-5'-P-CCNC: 20 U/L (ref 10–44)
ALT SERPL W P-5'-P-CCNC: 21 U/L (ref 10–44)
ANION GAP SERPL CALCULATED.3IONS-SCNC: 2.7 MMOL/L (ref 3.6–11.2)
ANION GAP SERPL CALCULATED.3IONS-SCNC: 4.4 MMOL/L (ref 3.6–11.2)
ANION GAP SERPL CALCULATED.3IONS-SCNC: 4.4 MMOL/L (ref 3.6–11.2)
ANISOCYTOSIS BLD QL: ABNORMAL
ANISOCYTOSIS BLD QL: ABNORMAL
ANISOCYTOSIS BLD QL: NORMAL
AST SERPL-CCNC: 29 U/L (ref 10–34)
AST SERPL-CCNC: 31 U/L (ref 10–34)
AST SERPL-CCNC: 31 U/L (ref 10–34)
BASOPHILS # BLD AUTO: 0 10*3/MM3 (ref 0–0.3)
BASOPHILS NFR BLD AUTO: 0 % (ref 0–2)
BILIRUB SERPL-MCNC: 0.4 MG/DL (ref 0.2–1.8)
BILIRUB SERPL-MCNC: 0.5 MG/DL (ref 0.2–1.8)
BILIRUB SERPL-MCNC: 0.6 MG/DL (ref 0.2–1.8)
BUN BLD-MCNC: 17 MG/DL (ref 7–21)
BUN BLD-MCNC: 22 MG/DL (ref 7–21)
BUN BLD-MCNC: 26 MG/DL (ref 7–21)
BUN/CREAT SERPL: 26.6 (ref 7–25)
BUN/CREAT SERPL: 31.9 (ref 7–25)
BUN/CREAT SERPL: 32.1 (ref 7–25)
BURR CELLS BLD QL SMEAR: ABNORMAL
BURR CELLS BLD QL SMEAR: ABNORMAL
CALCIUM SPEC-SCNC: 8.4 MG/DL (ref 7.7–10)
CALCIUM SPEC-SCNC: 8.5 MG/DL (ref 7.7–10)
CALCIUM SPEC-SCNC: 8.6 MG/DL (ref 7.7–10)
CHLORIDE SERPL-SCNC: 115 MMOL/L (ref 99–112)
CHLORIDE SERPL-SCNC: 116 MMOL/L (ref 99–112)
CHLORIDE SERPL-SCNC: 120 MMOL/L (ref 99–112)
CO2 SERPL-SCNC: 19.6 MMOL/L (ref 24.3–31.9)
CO2 SERPL-SCNC: 21.6 MMOL/L (ref 24.3–31.9)
CO2 SERPL-SCNC: 23.3 MMOL/L (ref 24.3–31.9)
CREAT BLD-MCNC: 0.64 MG/DL (ref 0.43–1.29)
CREAT BLD-MCNC: 0.69 MG/DL (ref 0.43–1.29)
CREAT BLD-MCNC: 0.81 MG/DL (ref 0.43–1.29)
CRP SERPL-MCNC: 6.02 MG/DL (ref 0–0.99)
CRP SERPL-MCNC: 7.71 MG/DL (ref 0–0.99)
DEPRECATED RDW RBC AUTO: 62 FL (ref 37–54)
DEPRECATED RDW RBC AUTO: 70.6 FL (ref 37–54)
DEPRECATED RDW RBC AUTO: 77.7 FL (ref 37–54)
EOSINOPHIL # BLD AUTO: 0.01 10*3/MM3 (ref 0–0.7)
EOSINOPHIL # BLD MANUAL: 0.01 10*3/MM3 (ref 0–0.7)
EOSINOPHIL NFR BLD AUTO: 3.8 % (ref 0–7)
EOSINOPHIL NFR BLD MANUAL: 3.3 % (ref 0–7)
ERYTHROCYTE [DISTWIDTH] IN BLOOD BY AUTOMATED COUNT: 21.9 % (ref 11.5–14.5)
ERYTHROCYTE [DISTWIDTH] IN BLOOD BY AUTOMATED COUNT: 24.5 % (ref 11.5–14.5)
ERYTHROCYTE [DISTWIDTH] IN BLOOD BY AUTOMATED COUNT: 26.7 % (ref 11.5–14.5)
GFR SERPL CREATININE-BSD FRML MDRD: 113 ML/MIN/1.73
GFR SERPL CREATININE-BSD FRML MDRD: 123 ML/MIN/1.73
GFR SERPL CREATININE-BSD FRML MDRD: 94 ML/MIN/1.73
GLOBULIN UR ELPH-MCNC: 4.1 GM/DL
GLOBULIN UR ELPH-MCNC: 4.3 GM/DL
GLOBULIN UR ELPH-MCNC: 4.3 GM/DL
GLUCOSE BLD-MCNC: 66 MG/DL (ref 70–110)
GLUCOSE BLD-MCNC: 70 MG/DL (ref 70–110)
GLUCOSE BLD-MCNC: 81 MG/DL (ref 70–110)
HCT VFR BLD AUTO: 24.9 % (ref 42–52)
HCT VFR BLD AUTO: 25.8 % (ref 42–52)
HCT VFR BLD AUTO: 26.9 % (ref 42–52)
HGB BLD-MCNC: 7.4 G/DL (ref 14–18)
HGB BLD-MCNC: 7.7 G/DL (ref 14–18)
HGB BLD-MCNC: 7.9 G/DL (ref 14–18)
HYPOCHROMIA BLD QL: ABNORMAL
HYPOCHROMIA BLD QL: NORMAL
IMM GRANULOCYTES # BLD: 0 10*3/MM3 (ref 0–0.03)
IMM GRANULOCYTES NFR BLD: 0 % (ref 0–0.5)
LYMPHOCYTES # BLD AUTO: 0.12 10*3/MM3 (ref 1–3)
LYMPHOCYTES # BLD MANUAL: 0.07 10*3/MM3 (ref 1–3)
LYMPHOCYTES # BLD MANUAL: 0.08 10*3/MM3 (ref 1–3)
LYMPHOCYTES NFR BLD AUTO: 46.2 % (ref 16–46)
LYMPHOCYTES NFR BLD MANUAL: 23.3 % (ref 0–12)
LYMPHOCYTES NFR BLD MANUAL: 24 % (ref 0–12)
LYMPHOCYTES NFR BLD MANUAL: 26.7 % (ref 16–46)
LYMPHOCYTES NFR BLD MANUAL: 32 % (ref 16–46)
MCH RBC QN AUTO: 24.8 PG (ref 27–33)
MCH RBC QN AUTO: 25.5 PG (ref 27–33)
MCH RBC QN AUTO: 26.1 PG (ref 27–33)
MCHC RBC AUTO-ENTMCNC: 29.4 G/DL (ref 33–37)
MCHC RBC AUTO-ENTMCNC: 29.7 G/DL (ref 33–37)
MCHC RBC AUTO-ENTMCNC: 29.8 G/DL (ref 33–37)
MCV RBC AUTO: 84.3 FL (ref 80–94)
MCV RBC AUTO: 85.9 FL (ref 80–94)
MCV RBC AUTO: 87.5 FL (ref 80–94)
MONOCYTES # BLD AUTO: 0.06 10*3/MM3 (ref 0.1–0.9)
MONOCYTES # BLD AUTO: 0.06 10*3/MM3 (ref 0.1–0.9)
MONOCYTES # BLD AUTO: 0.08 10*3/MM3 (ref 0.1–0.9)
MONOCYTES NFR BLD AUTO: 30.8 % (ref 0–12)
NEUTROPHILS # BLD AUTO: 0.05 10*3/MM3 (ref 1.4–6.5)
NEUTROPHILS # BLD AUTO: 0.11 10*3/MM3 (ref 1.4–6.5)
NEUTROPHILS # BLD AUTO: 0.12 10*3/MM3 (ref 1.4–6.5)
NEUTROPHILS NFR BLD AUTO: 19.2 % (ref 40–75)
NEUTROPHILS NFR BLD MANUAL: 40 % (ref 40–75)
NEUTROPHILS NFR BLD MANUAL: 46.7 % (ref 40–75)
NEUTS BAND NFR BLD MANUAL: 4 % (ref 0–8)
OSMOLALITY SERPL CALC.SUM OF ELEC: 283.7 MOSM/KG (ref 273–305)
OSMOLALITY SERPL CALC.SUM OF ELEC: 284.4 MOSM/KG (ref 273–305)
OSMOLALITY SERPL CALC.SUM OF ELEC: 288.4 MOSM/KG (ref 273–305)
OVALOCYTES BLD QL SMEAR: ABNORMAL
OVALOCYTES BLD QL SMEAR: ABNORMAL
PLATELET # BLD AUTO: 101 10*3/MM3 (ref 130–400)
PLATELET # BLD AUTO: 55 10*3/MM3 (ref 130–400)
PLATELET # BLD AUTO: 81 10*3/MM3 (ref 130–400)
PMV BLD AUTO: 10.3 FL (ref 6–10)
PMV BLD AUTO: 10.7 FL (ref 6–10)
PMV BLD AUTO: ABNORMAL FL (ref 6–10)
POIKILOCYTOSIS BLD QL SMEAR: ABNORMAL
POTASSIUM BLD-SCNC: 3.4 MMOL/L (ref 3.5–5.3)
POTASSIUM BLD-SCNC: 3.7 MMOL/L (ref 3.5–5.3)
POTASSIUM BLD-SCNC: 4.1 MMOL/L (ref 3.5–5.3)
PROT SERPL-MCNC: 5.9 G/DL (ref 6–8)
PROT SERPL-MCNC: 6 G/DL (ref 6–8)
PROT SERPL-MCNC: 6.1 G/DL (ref 6–8)
RBC # BLD AUTO: 2.9 10*6/MM3 (ref 4.7–6.1)
RBC # BLD AUTO: 2.95 10*6/MM3 (ref 4.7–6.1)
RBC # BLD AUTO: 3.19 10*6/MM3 (ref 4.7–6.1)
SCAN SLIDE: NORMAL
SCAN SLIDE: NORMAL
SMALL PLATELETS BLD QL SMEAR: ABNORMAL
SMALL PLATELETS BLD QL SMEAR: ABNORMAL
SMALL PLATELETS BLD QL SMEAR: NORMAL
SODIUM BLD-SCNC: 141 MMOL/L (ref 135–153)
SODIUM BLD-SCNC: 142 MMOL/L (ref 135–153)
SODIUM BLD-SCNC: 144 MMOL/L (ref 135–153)
WBC NRBC COR # BLD: 0.25 10*3/MM3 (ref 4.5–12.5)
WBC NRBC COR # BLD: 0.26 10*3/MM3 (ref 4.5–12.5)
WBC NRBC COR # BLD: 0.26 10*3/MM3 (ref 4.5–12.5)

## 2018-01-01 PROCEDURE — 85025 COMPLETE CBC W/AUTO DIFF WBC: CPT | Performed by: INTERNAL MEDICINE

## 2018-01-01 PROCEDURE — 80053 COMPREHEN METABOLIC PANEL: CPT | Performed by: INTERNAL MEDICINE

## 2018-01-01 PROCEDURE — 86140 C-REACTIVE PROTEIN: CPT | Performed by: INTERNAL MEDICINE

## 2018-01-01 PROCEDURE — 85007 BL SMEAR W/DIFF WBC COUNT: CPT | Performed by: INTERNAL MEDICINE
